# Patient Record
Sex: FEMALE | Race: WHITE | NOT HISPANIC OR LATINO | Employment: FULL TIME | URBAN - METROPOLITAN AREA
[De-identification: names, ages, dates, MRNs, and addresses within clinical notes are randomized per-mention and may not be internally consistent; named-entity substitution may affect disease eponyms.]

---

## 2023-02-28 ENCOUNTER — TELEPHONE (OUTPATIENT)
Dept: NEUROLOGY | Facility: OTHER | Age: 53
End: 2023-02-28
Payer: MEDICAID

## 2023-02-28 NOTE — TELEPHONE ENCOUNTER
PT CALLED TO GET AN APPT FOR AN EMG ON HER MANDIBLE.    PT HAD DENTAL SURGERY A YEAR AGO AND HER JAW BONE WAS BROKE.    PT HAS NUMBNESS AND TINGLING IN HER JAW AND TEETH.    PT WILL HAVE HER PCP SEND A REFERRAL.    TOLD PT DR LINDO WOULD DO A CONSULT TO DETERMINE WHAT WOULD BE BEST PER TOMY.    PER [facility] IN [place], THEY WOULD HAVE TO ASK DOCTOR SEIPEL.    PT WILLING TO TRAVEL TO [facility] OR [place] IF NEEDED.